# Patient Record
Sex: MALE | Race: WHITE | URBAN - METROPOLITAN AREA
[De-identification: names, ages, dates, MRNs, and addresses within clinical notes are randomized per-mention and may not be internally consistent; named-entity substitution may affect disease eponyms.]

---

## 2017-05-25 ENCOUNTER — IMPORTED ENCOUNTER (OUTPATIENT)
Dept: URBAN - METROPOLITAN AREA CLINIC 50 | Facility: CLINIC | Age: 72
End: 2017-05-25

## 2017-07-31 NOTE — PATIENT DISCUSSION
- Discussed options for treatment including cataract surgery and glasses update. Patient is busy at the moment (wife is in the hospital after hypotensive episode and is about to start rehab) so not currently interested in scheduling surgery.

## 2017-07-31 NOTE — PATIENT DISCUSSION
- Follow up in 6 months or sooner with vision changes.  Will consider scheduling surgery at next appointment

## 2017-07-31 NOTE — PATIENT DISCUSSION
- New glasses Rx today, patient understands that the new Rx will not significantly improve his vision

## 2018-10-01 ENCOUNTER — IMPORTED ENCOUNTER (OUTPATIENT)
Dept: URBAN - METROPOLITAN AREA CLINIC 50 | Facility: CLINIC | Age: 73
End: 2018-10-01

## 2019-06-06 NOTE — PATIENT DISCUSSION
- Recommend artificial tears up to QID OU (samples provided).  Likely causing his gritting sensation

## 2019-06-06 NOTE — PATIENT DISCUSSION
- Discussed the r/b/a of cataract surgery and patient wishes to schedule.  Follow up for cataract testing next available

## 2019-10-07 ENCOUNTER — IMPORTED ENCOUNTER (OUTPATIENT)
Dept: URBAN - METROPOLITAN AREA CLINIC 50 | Facility: CLINIC | Age: 74
End: 2019-10-07

## 2019-11-12 NOTE — PATIENT DISCUSSION
- Will select standard monofocal IOL for distance, RIGHT eye first, with LRI.  Will schedule in the new year

## 2020-09-11 ENCOUNTER — IMPORTED ENCOUNTER (OUTPATIENT)
Dept: URBAN - METROPOLITAN AREA CLINIC 50 | Facility: CLINIC | Age: 75
End: 2020-09-11

## 2020-09-14 ENCOUNTER — IMPORTED ENCOUNTER (OUTPATIENT)
Dept: URBAN - METROPOLITAN AREA CLINIC 50 | Facility: CLINIC | Age: 75
End: 2020-09-14

## 2020-11-16 ENCOUNTER — IMPORTED ENCOUNTER (OUTPATIENT)
Dept: URBAN - METROPOLITAN AREA CLINIC 50 | Facility: CLINIC | Age: 75
End: 2020-11-16

## 2021-04-17 ASSESSMENT — VISUAL ACUITY
OD_CC: J1+/-
OD_CC: 20/20
OS_CC: 20/20-
OS_CC: 20/20-1
OD_CC: 20/20
OD_BAT: 20/30
OS_CC: J1+/-
OD_BAT: 20/30
OD_PH: @ 17 IN
OD_BAT: 20/30
OS_PH: @ 17 IN
OS_OTHER: 20/40. 20/70.
OS_BAT: 20/40
OD_CC: 20/20-1
OS_CC: J1+-1@ 17 IN
OD_CC: 20/20-
OS_BAT: 20/30
OS_BAT: 20/40
OD_CC: J1+
OS_OTHER: 20/40. 20/40.
OD_CC: 20/25
OS_CC: 20/20
OS_CC: 20/20
OS_OTHER: 20/30. 20/80.
OD_BAT: 20/40
OS_BAT: 20/30
OD_OTHER: 20/40. 20/70.
OS_CC: J1+
OD_OTHER: 20/30. 20/60.
OD_OTHER: 20/30. 20/50.
OS_OTHER: 20/30. 20/40.
OD_OTHER: 20/30. 20/40.
OD_CC: J1+-1@ 17 IN
OS_CC: 20/20

## 2021-04-17 ASSESSMENT — TONOMETRY
OD_IOP_MMHG: 11
OS_IOP_MMHG: 12
OS_IOP_MMHG: 14
OS_IOP_MMHG: 11
OD_IOP_MMHG: 14
OS_IOP_MMHG: 14
OS_IOP_MMHG: 13
OD_IOP_MMHG: 14
OD_IOP_MMHG: 13
OD_IOP_MMHG: 14

## 2021-11-11 ENCOUNTER — PREPPED CHART (OUTPATIENT)
Dept: URBAN - METROPOLITAN AREA CLINIC 53 | Facility: CLINIC | Age: 76
End: 2021-11-11

## 2021-11-15 ENCOUNTER — ANNUAL COMPREHENSIVE EXAM (OUTPATIENT)
Dept: URBAN - METROPOLITAN AREA CLINIC 53 | Facility: CLINIC | Age: 76
End: 2021-11-15

## 2021-11-15 DIAGNOSIS — D31.32: ICD-10-CM

## 2021-11-15 DIAGNOSIS — H25.13: ICD-10-CM

## 2021-11-15 PROCEDURE — 92015 DETERMINE REFRACTIVE STATE: CPT

## 2021-11-15 PROCEDURE — 92014 COMPRE OPH EXAM EST PT 1/>: CPT

## 2021-11-15 ASSESSMENT — TONOMETRY
OD_IOP_MMHG: 16
OS_IOP_MMHG: 16

## 2021-11-15 ASSESSMENT — VISUAL ACUITY
OD_GLARE: 20/20
OD_CC: J1+
OS_CC: 20/20-2
OS_GLARE: 20/20
OD_GLARE: 20/20
OD_CC: 20/20-2
OS_GLARE: 20/20
OS_CC: J1+

## 2022-11-14 ENCOUNTER — COMPREHENSIVE EXAM (OUTPATIENT)
Dept: URBAN - METROPOLITAN AREA CLINIC 53 | Facility: CLINIC | Age: 77
End: 2022-11-14

## 2022-11-14 DIAGNOSIS — D31.32: ICD-10-CM

## 2022-11-14 DIAGNOSIS — H43.813: ICD-10-CM

## 2022-11-14 DIAGNOSIS — H25.13: ICD-10-CM

## 2022-11-14 PROCEDURE — 92014 COMPRE OPH EXAM EST PT 1/>: CPT

## 2022-11-14 ASSESSMENT — VISUAL ACUITY
OS_CC: 20/20
OU_CC: J1+
OD_GLARE: 20/20
OD_CC: 20/20
OU_CC: 20/20
OD_GLARE: 20/20
OS_GLARE: 20/20
OS_GLARE: 20/20

## 2022-11-14 ASSESSMENT — TONOMETRY
OS_IOP_MMHG: 13
OD_IOP_MMHG: 13

## 2023-11-27 ENCOUNTER — COMPREHENSIVE EXAM (OUTPATIENT)
Dept: URBAN - METROPOLITAN AREA CLINIC 53 | Facility: CLINIC | Age: 78
End: 2023-11-27

## 2023-11-27 DIAGNOSIS — D31.32: ICD-10-CM

## 2023-11-27 DIAGNOSIS — H25.13: ICD-10-CM

## 2023-11-27 DIAGNOSIS — H43.813: ICD-10-CM

## 2023-11-27 PROCEDURE — 92015 DETERMINE REFRACTIVE STATE: CPT

## 2023-11-27 PROCEDURE — 99214 OFFICE O/P EST MOD 30 MIN: CPT

## 2023-11-27 ASSESSMENT — VISUAL ACUITY
OD_CC: 20/30
OS_GLARE: 20/40
OS_GLARE: 20/40
OD_GLARE: 20/30
OU_CC: J1@14"
OD_GLARE: 20/30
OS_CC: 20/25

## 2023-11-27 ASSESSMENT — KERATOMETRY
OD_AXISANGLE2_DEGREES: 71
OS_AXISANGLE_DEGREES: 024
OS_K1POWER_DIOPTERS: 43.50
OD_K2POWER_DIOPTERS: 43.25
OS_AXISANGLE2_DEGREES: 114
OS_K2POWER_DIOPTERS: 44.25
OD_K1POWER_DIOPTERS: 42.75
OD_AXISANGLE_DEGREES: 161

## 2023-11-27 ASSESSMENT — TONOMETRY
OD_IOP_MMHG: 13
OS_IOP_MMHG: 13

## 2024-12-02 ENCOUNTER — CONSULTATION/EVALUATION (OUTPATIENT)
Age: 79
End: 2024-12-02

## 2024-12-02 DIAGNOSIS — H52.4: ICD-10-CM

## 2024-12-02 DIAGNOSIS — H43.813: ICD-10-CM

## 2024-12-02 DIAGNOSIS — H25.13: ICD-10-CM

## 2024-12-02 DIAGNOSIS — D31.32: ICD-10-CM

## 2024-12-02 PROCEDURE — 92015 DETERMINE REFRACTIVE STATE: CPT

## 2024-12-02 PROCEDURE — 99214 OFFICE O/P EST MOD 30 MIN: CPT

## 2024-12-02 PROCEDURE — 92134 CPTRZ OPH DX IMG PST SGM RTA: CPT

## 2024-12-16 ENCOUNTER — PRE-OP/H&P (OUTPATIENT)
Age: 79
End: 2024-12-16

## 2024-12-16 DIAGNOSIS — H25.13: ICD-10-CM

## 2024-12-16 PROCEDURE — 92025-3 CORNEAL TOPO, REFUSED

## 2024-12-31 ENCOUNTER — SURGERY/PROCEDURE (OUTPATIENT)
Age: 79
End: 2024-12-31

## 2024-12-31 ENCOUNTER — POST-OP (OUTPATIENT)
Age: 79
End: 2024-12-31

## 2024-12-31 DIAGNOSIS — H25.11: ICD-10-CM

## 2024-12-31 DIAGNOSIS — Z96.1: ICD-10-CM

## 2024-12-31 DIAGNOSIS — H25.12: ICD-10-CM

## 2024-12-31 PROCEDURE — 66984 XCAPSL CTRC RMVL W/O ECP: CPT

## 2025-01-06 ENCOUNTER — POST-OP (OUTPATIENT)
Age: 80
End: 2025-01-06

## 2025-01-06 DIAGNOSIS — H25.12: ICD-10-CM

## 2025-01-06 DIAGNOSIS — Z98.41: ICD-10-CM

## 2025-01-06 DIAGNOSIS — Z96.1: ICD-10-CM

## 2025-01-06 PROCEDURE — 99024 POSTOP FOLLOW-UP VISIT: CPT

## 2025-01-29 ENCOUNTER — POST-OP (OUTPATIENT)
Age: 80
End: 2025-01-29

## 2025-01-29 ENCOUNTER — SURGERY/PROCEDURE (OUTPATIENT)
Age: 80
End: 2025-01-29

## 2025-01-29 DIAGNOSIS — Z98.42: ICD-10-CM

## 2025-01-29 DIAGNOSIS — Z96.1: ICD-10-CM

## 2025-01-29 DIAGNOSIS — H25.12: ICD-10-CM

## 2025-01-29 PROCEDURE — 66984 XCAPSL CTRC RMVL W/O ECP: CPT | Mod: 79,LT

## 2025-02-03 ENCOUNTER — POST-OP (OUTPATIENT)
Age: 80
End: 2025-02-03

## 2025-02-03 DIAGNOSIS — Z96.1: ICD-10-CM

## 2025-02-03 DIAGNOSIS — Z98.42: ICD-10-CM

## 2025-02-24 ENCOUNTER — POST-OP (OUTPATIENT)
Age: 80
End: 2025-02-24

## 2025-02-24 DIAGNOSIS — Z96.1: ICD-10-CM

## 2025-02-24 DIAGNOSIS — Z98.42: ICD-10-CM

## 2025-02-24 DIAGNOSIS — H52.4: ICD-10-CM

## 2025-02-24 DIAGNOSIS — H26.493: ICD-10-CM

## 2025-02-24 DIAGNOSIS — Z98.41: ICD-10-CM

## 2025-02-24 DIAGNOSIS — D31.32: ICD-10-CM

## 2025-02-24 DIAGNOSIS — H43.813: ICD-10-CM

## 2025-02-24 PROCEDURE — 92015 DETERMINE REFRACTIVE STATE: CPT | Mod: NC

## 2025-02-24 PROCEDURE — 99024 POSTOP FOLLOW-UP VISIT: CPT
